# Patient Record
Sex: FEMALE | Race: ASIAN | NOT HISPANIC OR LATINO | Employment: UNEMPLOYED | URBAN - METROPOLITAN AREA
[De-identification: names, ages, dates, MRNs, and addresses within clinical notes are randomized per-mention and may not be internally consistent; named-entity substitution may affect disease eponyms.]

---

## 2020-07-23 ENCOUNTER — ANESTHESIA - HEALTHEAST (OUTPATIENT)
Dept: SURGERY | Facility: CLINIC | Age: 25
End: 2020-07-23

## 2020-07-23 ENCOUNTER — SURGERY - HEALTHEAST (OUTPATIENT)
Dept: SURGERY | Facility: CLINIC | Age: 25
End: 2020-07-23

## 2020-07-23 DIAGNOSIS — R10.84 ABDOMINAL PAIN, GENERALIZED: Primary | ICD-10-CM

## 2020-07-23 ASSESSMENT — MIFFLIN-ST. JEOR
SCORE: 1182.22
SCORE: 1182.22

## 2020-07-24 ENCOUNTER — SURGERY - HEALTHEAST (OUTPATIENT)
Dept: SURGERY | Facility: CLINIC | Age: 25
End: 2020-07-24

## 2020-07-28 ENCOUNTER — DOCUMENTATION ONLY (OUTPATIENT)
Dept: FAMILY MEDICINE | Facility: CLINIC | Age: 25
End: 2020-07-28

## 2020-07-28 NOTE — PROGRESS NOTES
Date of discharge: 07/24/20  Facility of discharge: [facility]  Patient concerns about condition: still a little sore from the surergy.  Patient concerns about medications: No concerns at this time.  Full med reconciliation will be completed at clinic visit.  Patient concerns about transitioning: No concerns at this time.    Was the patient diagnosed with COVID while in the hospital? No    Clinic office visit appointment date: 8/4/20 with Dr. Bobo  Patient reminded to bring all medications (prescription and over-the-counter) to clinic appointment: Yes    SHARMAINE Dumont

## 2020-08-03 ENCOUNTER — ALLIED HEALTH/NURSE VISIT (OUTPATIENT)
Dept: PHARMACY | Facility: PHYSICIAN GROUP | Age: 25
End: 2020-08-03
Payer: COMMERCIAL

## 2020-08-03 DIAGNOSIS — K35.80 ACUTE APPENDICITIS, UNSPECIFIED ACUTE APPENDICITIS TYPE: Primary | ICD-10-CM

## 2020-08-03 RX ORDER — AMOXICILLIN 250 MG
1 CAPSULE ORAL 2 TIMES DAILY
COMMUNITY
Start: 2020-07-24

## 2020-08-03 RX ORDER — ACETAMINOPHEN 500 MG
500-1000 TABLET ORAL 3 TIMES DAILY PRN
COMMUNITY
Start: 2020-07-24

## 2020-08-03 NOTE — PROGRESS NOTES
MTM ENCOUNTER  SUBJECTIVE/OBJECTIVE:                           Eh Day Day is a 24 year old female called for a TCM visit.  She was discharged from [facility] on 7/24/20 for appendicitis. Today's visit is a co-visit with Dr. Bobo, who is seeing pt tomorrow.     Patient consented to a telehealth visit: yes  Telemedicine Visit Details  Type of service:  Telephone visit  Start Time: 2:40pm  End Time: 2:50  Originating Location (pt. Location): Home  Distant Location (provider location):  [facility] MTM  Mode of Communication:  Telephone    Chief Complaint: hospital followup.    The following medications were added in the hospital:    APAP 500mg 1-2 tabs q4h prn    Senna Doc 8.6/50 1 tab BID  The following medications were discontinued in the hospital:    None  The following medications had dose/frequency changes in the hospital:    None  The following medication changes made in the hospital were not implemented by the patient:    Ibuprofen 600mg q 6 hr prn was started and patient has but id not taking.    Allergies/ADRs: Reviewed in EHR  Tobacco: Never smoker  Alcohol: never used  PMH: Reviewed in EHR    Medication Adherence/Access: no issues reported    S/P appendicitis/appendectomy: Taking APAP 500mg 2 tabs q6hr PRN (using regularly 3 times daily). No side effects. Helps pain from her surgery.    Constipation: Taking senna/doc 1 tab BID regularly. Effective; having 1 BM daily. No adverse effects.    Today's Vitals: There were no vitals taken for this visit.      ASSESSMENT:                              Medication Adherence: good, no issues identified    S/P appendicitis/appendectomy: Stable    Constipation: Stable      PLAN:                          Post Discharge Medication Reconciliation Status: discharge medications reconciled, continue medications without change.       Continue acetaminophen and senna/doc.    I spent 10 minutes with this patient today. All changes were made via  collaborative practice agreement with Dr. Bobo. A copy of the visit note was provided to the patient's referring provider.    Will follow up with Dr. Bobo tomorrow.    The patient was given a summary of these recommendations per AVS from Dr. Bobo (see his AVS tomorrow).    Kelly Mancuso, Pharm.D.

## 2020-08-04 ENCOUNTER — OFFICE VISIT (OUTPATIENT)
Dept: FAMILY MEDICINE | Facility: CLINIC | Age: 25
End: 2020-08-04
Payer: COMMERCIAL

## 2020-08-04 VITALS
WEIGHT: 110.6 LBS | BODY MASS INDEX: 22.3 KG/M2 | SYSTOLIC BLOOD PRESSURE: 90 MMHG | RESPIRATION RATE: 20 BRPM | OXYGEN SATURATION: 100 % | HEIGHT: 59 IN | TEMPERATURE: 97.7 F | DIASTOLIC BLOOD PRESSURE: 61 MMHG | HEART RATE: 68 BPM

## 2020-08-04 DIAGNOSIS — D50.9 MICROCYTIC ANEMIA: ICD-10-CM

## 2020-08-04 DIAGNOSIS — Z09 S/P APPENDECTOMY, FOLLOW-UP EXAM: Primary | ICD-10-CM

## 2020-08-04 ASSESSMENT — MIFFLIN-ST. JEOR: SCORE: 1148.18

## 2020-08-04 NOTE — PROGRESS NOTES
Preceptor Attestation:    Patient seen and evaluated in person. I discussed the patient with the resident. I have verified the content of the note, which accurately reflects my assessment of the patient and the plan of care.   Supervising Physician:  Randall Wahl MD.

## 2020-08-04 NOTE — LETTER
August 20, 2020       Day Day  [address] [place] [address]        Dear ,    We are writing to inform you of your test results.    Your blood work indicates that you have alpha thalassemia.     Alpha thalassemia is a blood disorder. It affects the production of a protein in the red blood cells (RBCs). This protein, called hemoglobin, allows RBCs to carry oxygen to all parts of the body. Hemoglobin is made up of smaller parts, including alpha globin and beta globin. When problems with alpha globin lead to less hemoglobin in the blood, the condition is called alpha thalassemia. Alpha thalassemia can cause anemia (not enough healthy hemoglobin in the blood).         Who is at risk of alpha thalassemia?   This condition affects certain groups more often than others. This includes people who have family members from [place], [place], [place], and [place].     What are the types of alpha thalassemia?   Alpha thalassemia is inherited. This means it is passed from parent to child through genes. There are 4 genes for alpha globin. If any one of these genes is abnormal, the amount of hemoglobin in the body is affected. Alpha thalassemia is broken down into types. The number of problem genes determines the type. The 4 main types are:     Silent carrier state. This type occurs when there is only 1 abnormal gene. It is unlikely to cause health problems.     Alpha thalassemia trait (also called alpha thalassemia minor). This type occurs when there are 2 abnormal genes. It may affect the size of the RBCs and cause mild anemia.       Hemoglobin H disease. This type occurs when there are 3 abnormal genes. It is more likely to affect the RBCs and cause moderate to severe anemia. It can also lead to other problems in the body.     Alpha thalassemia major. This type occurs when there are 4 abnormal genes. It is the most severe form of the disease and very rare. Hemoglobin is not able to be produced. A  baby with this condition will usually die in the mother's uterus or soon after birth.     Variations of these types can also occur.     What are the symptoms of alpha thalassemia?   The symptoms of alpha thalassemia depend on the type. Most silent carriers do not have symptoms. Children with alpha thalassemia trait or hemoglobin H disease may have symptoms that range from mild to severe. These include:     Pale skin     Crankiness     Weakness     Tiredness     Shortness of breath     Trouble doing normal amounts of exercise (exercise intolerance)     Fast heartbeat     Dizziness or fainting     Yellowing of the eyes, skin, or mouth, and dark urine (jaundice)     Bone problems including bones of the face or skull being wider     Stomach pain and swelling of the belly     Not growing as expected       Alpha thalassemia is a lifelong condition.       Parents who both have alpha thalassemia trait can have a child with more severe disease. Talk to a genetic counselor for more information.       Fel free to contact the clinic with further questions or concerns.     Resulted Orders   Hemoglobinopathy/Thal Luis ([facility])   Result Value Ref Range    Hemoglobin A2 5.6 (H) 2.2 - 3.5 %    Hemoglobin F 3.7 (H) 0.0 - 2.0 %    Hemoglobin Electrophoresis SEE RESULTS BELOW       Comment:      Increase in hemoglobin A2 fraction accompanied by mild increase in hemoglobin   F fraction.  This patient has microcytic hypochromic anemia in addition to elevations in   hemoglobin A2 and hemoglobin F, a nonspecific profile that may be observed in   the setting of alpha thalassemia with or without a concurrent abnormality in   beta globin synthesis. Consideration may be given to genetic sequencing   studies in order to definitively characterize this patient's apparent   underlying congenital hemoglobinopathy.       Path ICD D50.9     Interpreted By Jani Mccann MD     Narrative    Test performed by:  [facility]   [address] [place] [address]     If you have any questions or concerns, please call the clinic at the number listed above.     Sincerely,    Dimitri Bobo MD

## 2020-08-04 NOTE — NURSING NOTE
Due to patient being non-English speaking/uses sign language, an  was used for this visit. Only for face-to-face interpretation by an external agency, date and length of interpretation can be found on the scanned worksheet.       name: Mejia Marsh (Htoo)  Language: Olivia  Agency:  [employer]  Phone number: [phone]  Type of interpretation:  telephone, spoken

## 2020-08-04 NOTE — PROGRESS NOTES
"  Hospitalization Follow-up Visit         [facility]       Hospital Follow-up Visit:    Hospital:  [facility]   Date of Admission: 7/23/20  Date of Discharge: 7/24/20  Reason(s) for Admission: Acute appendicitis            Problems taking medications regularly:  None       Post Discharge Medication Reconciliation: medication reconcilation previously completed during another office visit.       Problems adhering to non-medication therapy:  None       Medications reviewed by: by PharmD    Summary of hospitalization:  [facility] discharge summary reviewed  Diagnostic Tests/Treatments reviewed.  Follow up needed: none  Other Healthcare Providers Involved in Patient s Care:         None  Update since discharge: improved. Still with some incisional pain at the lower quadrant, relieved with Tylenol and IBU.   Plan of care communicated with patient            A [employer]  was used for  this visit.             Review of Systems:   CONSTITUTIONAL: no fatigue, some lightheadedness  SKIN: incisions well healeds  RESP: no significant cough, no shortness of breath  CV: no chest pain, no palpitations  GI: no nausea, no vomiting, no constipation, no diarrhea            Physical Exam:     Vitals:    08/04/20 0923   BP: 90/61   BP Location: Right arm   Patient Position: Sitting   Cuff Size: Adult Regular   Pulse: 68   Resp: 20   Temp: 97.7  F (36.5  C)   TempSrc: Oral   SpO2: 100%   Weight: 50.2 kg (110 lb 9.6 oz)   Height: 1.5 m (4' 11.06\")     Body mass index is 22.3 kg/m .    GENERAL: healthy, alert and no distress  EYES: Eyes grossly normal to inspection, extraocular movements - intact  RESP: normal work of breathing  CV: appears well perfused  ABDOMEN: soft, no tenderness, no  hepatosplenomegaly, no masses, normal bowel sounds, TTP at incision sites--otherwise clean, dry, intact  MS: extremities- no gross deformities noted, no edema  NEURO: strength and tone- normal, sensory exam- grossly normal, mentation- intact, " speech- normal  PSYCH: Alert and oriented times 3; speech- coherent , normal rate and volume; able to articulate logical thoughts, able to abstract reason, no tangential thoughts, no hallucinations or delusions, affect- normal      Assessment and Plan       was seen today for hospital f/u.    Diagnoses and all orders for this visit:    S/P appendectomy, follow-up exam  Improving. Pain adequately controlled with IBU and Tylenol. Advised to continue pain medications and stool softeners and to follow up as needed. Anticipate full recovery without complications.     Microcytic anemia  Reports positive history of thalassemia in brother and sister. Did have low iron studies and microcytic anemia in the hospital. If negative for hemoglobinopathy, will send for iron supplementation.   -     Hemoglobinopathy/Thal Luis ([facility])      E&M code to be billed if TCM cannot be: 91177    Type of decision making: Moderate complexity (89499)    Options for treatment and follow-up care were reviewed with the patient   [facility]   engaged in the decision making process and verbalized understanding of the options discussed and agreed with the final plan.      Dimitri Bobo MD  8/4/2020    Staffed with Dr. Wahl.

## 2020-08-07 LAB
HEMOGLOBIN A2: 5.6 % (ref 2.2–3.5)
HEMOGLOBIN ELECTROPHORESIS: ABNORMAL
HEMOGLOBIN F: 3.7 % (ref 0–2)
INTERPRETED BY: ABNORMAL
PATH ICD: ABNORMAL

## 2020-08-11 NOTE — RESULT ENCOUNTER NOTE
Your blood work indicates that you have alpha thalassemia.     Alpha thalassemia is a blood disorder. It affects the production of a protein in the red blood cells (RBCs). This protein, called hemoglobin, allows RBCs to carry oxygen to all parts of the body. Hemoglobin is made up of smaller parts, including alpha globin and beta globin. When problems with alpha globin lead to less hemoglobin in the blood, the condition is called alpha thalassemia. Alpha thalassemia can cause anemia (not enough healthy hemoglobin in the blood).       Who is at risk of alpha thalassemia?  This condition affects certain groups more often than others. This includes people who have family members from [place], [place], [place], and [place].    What are the types of alpha thalassemia?  Alpha thalassemia is inherited. This means it is passed from parent to child through genes. There are 4 genes for alpha globin. If any one of these genes is abnormal, the amount of hemoglobin in the body is affected. Alpha thalassemia is broken down into types. The number of problem genes determines the type. The 4 main types are:    Silent carrier state. This type occurs when there is only 1 abnormal gene. It is unlikely to cause health problems.    Alpha thalassemia trait (also called alpha thalassemia minor). This type occurs when there are 2 abnormal genes. It may affect the size of the RBCs and cause mild anemia.      Hemoglobin H disease. This type occurs when there are 3 abnormal genes. It is more likely to affect the RBCs and cause moderate to severe anemia. It can also lead to other problems in the body.    Alpha thalassemia major. This type occurs when there are 4 abnormal genes. It is the most severe form of the disease and very rare. Hemoglobin is not able to be produced. A baby with this condition will usually die in the mother's uterus or soon after birth.    Variations of these types can also occur.     What are the symptoms of  alpha thalassemia?  The symptoms of alpha thalassemia depend on the type. Most silent carriers do not have symptoms. Children with alpha thalassemia trait or hemoglobin H disease may have symptoms that range from mild to severe. These include:    Pale skin    Crankiness    Weakness    Tiredness    Shortness of breath    Trouble doing normal amounts of exercise (exercise intolerance)    Fast heartbeat    Dizziness or fainting    Yellowing of the eyes, skin, or mouth, and dark urine (jaundice)    Bone problems including bones of the face or skull being wider    Stomach pain and swelling of the belly    Not growing as expected      Alpha thalassemia is a lifelong condition.       Parents who both have alpha thalassemia trait can have a child with more severe disease. Talk to a genetic counselor for more information.      Fel free to contact the clinic with further questions or concerns.

## 2021-06-04 VITALS
HEIGHT: 59 IN | BODY MASS INDEX: 24.07 KG/M2 | WEIGHT: 119.4 LBS | HEIGHT: 59 IN | WEIGHT: 119.4 LBS | BODY MASS INDEX: 24.07 KG/M2

## 2021-06-09 NOTE — ANESTHESIA POSTPROCEDURE EVALUATION
Patient: Ana Olvera  Procedure(s):  APPENDECTOMY, LAPAROSCOPIC  Anesthesia type: general    Patient location: PACU  Last vitals:   Vitals Value Taken Time   BP 99/62 7/24/2020  2:45 AM   Temp 37  C (98.6  F) 7/24/2020  2:45 AM   Pulse 94 7/24/2020  2:45 AM   Resp 24 7/24/2020  2:45 AM   SpO2 96 % 7/24/2020  2:45 AM     Post vital signs: stable  Level of consciousness: awake and responds to simple questions  Post-anesthesia pain: pain controlled  Post-anesthesia nausea and vomiting: no  Pulmonary: unassisted, return to baseline  Cardiovascular: stable and blood pressure at baseline  Hydration: adequate  Anesthetic events: no    QCDR Measures:  ASA# 11 - Josy-op Cardiac Arrest: ASA11B - Patient did NOT experience unanticipated cardiac arrest  ASA# 12 - Josy-op Mortality Rate: ASA12B - Patient did NOT die  ASA# 13 - PACU Re-Intubation Rate: ASA13B - Patient did NOT require a new airway mgmt  ASA# 10 - Composite Anes Safety: ASA10A - No serious adverse event    Additional Notes:

## 2021-06-09 NOTE — ANESTHESIA PREPROCEDURE EVALUATION
Anesthesia Evaluation      Patient summary reviewed     Airway   Mallampati: II  Neck ROM: full   Pulmonary - normal exam                          Cardiovascular   Exercise tolerance: > or = 4 METS  Rhythm: regular  Rate: normal,         Neuro/Psych      Endo/Other    (-) no obesity     GI/Hepatic/Renal       Other findings: No Covid result      Dental - normal exam                        Anesthesia Plan  Planned anesthetic: general endotracheal    ASA 2   Induction: intravenous   Anesthetic plan and risks discussed with: patient  Anesthesia plan special considerations: antiemetics,   Post-op plan: routine recovery

## 2021-06-16 PROBLEM — K35.80 ACUTE APPENDICITIS, UNSPECIFIED ACUTE APPENDICITIS TYPE: Status: ACTIVE | Noted: 2020-07-23

## 2021-06-16 PROBLEM — K37 APPENDICITIS: Status: ACTIVE | Noted: 2020-07-24

## 2021-06-16 PROBLEM — K35.80 ACUTE APPENDICITIS: Status: ACTIVE | Noted: 2020-07-23

## 2021-11-24 ENCOUNTER — IMMUNIZATION (OUTPATIENT)
Dept: FAMILY MEDICINE | Facility: CLINIC | Age: 26
End: 2021-11-24
Payer: COMMERCIAL

## 2021-11-24 PROCEDURE — 91300 PR COVID VAC PFIZER DIL RECON 30 MCG/0.3 ML IM: CPT

## 2021-11-24 PROCEDURE — 0001A PR COVID VAC PFIZER DIL RECON 30 MCG/0.3 ML IM: CPT

## 2021-12-15 ENCOUNTER — IMMUNIZATION (OUTPATIENT)
Dept: FAMILY MEDICINE | Facility: CLINIC | Age: 26
End: 2021-12-15
Attending: FAMILY MEDICINE
Payer: COMMERCIAL

## 2021-12-15 PROCEDURE — 91300 PR COVID VAC PFIZER DIL RECON 30 MCG/0.3 ML IM: CPT

## 2021-12-15 PROCEDURE — 0002A PR COVID VAC PFIZER DIL RECON 30 MCG/0.3 ML IM: CPT

## 2023-03-17 NOTE — ANESTHESIA CARE TRANSFER NOTE
Last vitals:   Vitals:    07/24/20 0226   BP: 110/68   Pulse: 100   Resp: 24   Temp: 36.8  C (98.2  F)   SpO2: 100%     Patient's level of consciousness is drowsy  Spontaneous respirations: yes  Maintains airway independently: yes  Dentition unchanged: yes  Oropharynx: oropharynx clear of all foreign objects    QCDR Measures:  ASA# 20 - Surgical Safety Checklist: WHO surgical safety checklist completed prior to induction    PQRS# 430 - Adult PONV Prevention: 4558F - Pt received => 2 anti-emetic agents (different classes) preop & intraop  ASA# 8 - Peds PONV Prevention: NA - Not pediatric patient, not GA or 2 or more risk factors NOT present  PQRS# 424 - Josy-op Temp Management: 4559F - At least one body temp DOCUMENTED => 35.5C or 95.9F within required timeframe  PQRS# 426 - PACU Transfer Protocol: - Transfer of care checklist used  ASA# 14 - Acute Post-op Pain: ASA14B - Patient did NOT experience pain >= 7 out of 10   [Time Spent: ___ minutes] : I have spent [unfilled] minutes of time on the encounter.

## 2024-04-14 ENCOUNTER — HOSPITAL ENCOUNTER (EMERGENCY)
Facility: HOSPITAL | Age: 29
Discharge: HOME | End: 2024-04-14
Attending: EMERGENCY MEDICINE | Admitting: EMERGENCY MEDICINE
Payer: COMMERCIAL

## 2024-04-14 ENCOUNTER — APPOINTMENT (OUTPATIENT)
Dept: CT IMAGING | Facility: HOSPITAL | Age: 29
End: 2024-04-14
Attending: EMERGENCY MEDICINE
Payer: COMMERCIAL

## 2024-04-14 VITALS
HEART RATE: 59 BPM | RESPIRATION RATE: 18 BRPM | HEIGHT: 59 IN | BODY MASS INDEX: 23.18 KG/M2 | OXYGEN SATURATION: 99 % | TEMPERATURE: 98.9 F | SYSTOLIC BLOOD PRESSURE: 91 MMHG | WEIGHT: 115 LBS | DIASTOLIC BLOOD PRESSURE: 56 MMHG

## 2024-04-14 DIAGNOSIS — M54.9 UPPER BACK PAIN: ICD-10-CM

## 2024-04-14 DIAGNOSIS — R07.9 CHEST PAIN, UNSPECIFIED TYPE: ICD-10-CM

## 2024-04-14 LAB
ANION GAP SERPL CALCULATED.3IONS-SCNC: 8 MMOL/L (ref 7–15)
BUN SERPL-MCNC: 8.4 MG/DL (ref 6–20)
CALCIUM SERPL-MCNC: 9.3 MG/DL (ref 8.6–10)
CHLORIDE SERPL-SCNC: 102 MMOL/L (ref 98–107)
CREAT SERPL-MCNC: 0.63 MG/DL (ref 0.51–0.95)
DEPRECATED HCO3 PLAS-SCNC: 27 MMOL/L (ref 22–29)
EGFRCR SERPLBLD CKD-EPI 2021: >90 ML/MIN/1.73M2
ERYTHROCYTE [DISTWIDTH] IN BLOOD BY AUTOMATED COUNT: 17.4 % (ref 10–15)
GLUCOSE SERPL-MCNC: 89 MG/DL (ref 70–99)
HCG SERPL QL: NEGATIVE
HCT VFR BLD AUTO: 34.3 % (ref 35–47)
HGB BLD-MCNC: 10.8 G/DL (ref 11.7–15.7)
HOLD SPECIMEN: NORMAL
MCH RBC QN AUTO: 19.7 PG (ref 26.5–33)
MCHC RBC AUTO-ENTMCNC: 31.5 G/DL (ref 31.5–36.5)
MCV RBC AUTO: 63 FL (ref 78–100)
PLATELET # BLD AUTO: 346 10E3/UL (ref 150–450)
POTASSIUM SERPL-SCNC: 4 MMOL/L (ref 3.4–5.3)
RBC # BLD AUTO: 5.47 10E6/UL (ref 3.8–5.2)
SODIUM SERPL-SCNC: 137 MMOL/L (ref 135–145)
TROPONIN T SERPL HS-MCNC: <6 NG/L
WBC # BLD AUTO: 5.9 10E3/UL (ref 4–11)

## 2024-04-14 PROCEDURE — 250N000011 HC RX IP 250 OP 636: Performed by: EMERGENCY MEDICINE

## 2024-04-14 PROCEDURE — 84703 CHORIONIC GONADOTROPIN ASSAY: CPT | Performed by: EMERGENCY MEDICINE

## 2024-04-14 PROCEDURE — 36415 COLL VENOUS BLD VENIPUNCTURE: CPT | Performed by: EMERGENCY MEDICINE

## 2024-04-14 PROCEDURE — 84484 ASSAY OF TROPONIN QUANT: CPT | Performed by: EMERGENCY MEDICINE

## 2024-04-14 PROCEDURE — 85027 COMPLETE CBC AUTOMATED: CPT | Performed by: EMERGENCY MEDICINE

## 2024-04-14 PROCEDURE — 96374 THER/PROPH/DIAG INJ IV PUSH: CPT | Mod: 59

## 2024-04-14 PROCEDURE — 80048 BASIC METABOLIC PNL TOTAL CA: CPT | Performed by: EMERGENCY MEDICINE

## 2024-04-14 PROCEDURE — 71275 CT ANGIOGRAPHY CHEST: CPT

## 2024-04-14 PROCEDURE — 93005 ELECTROCARDIOGRAM TRACING: CPT | Performed by: EMERGENCY MEDICINE

## 2024-04-14 PROCEDURE — 99285 EMERGENCY DEPT VISIT HI MDM: CPT | Mod: 25

## 2024-04-14 RX ORDER — IOPAMIDOL 755 MG/ML
100 INJECTION, SOLUTION INTRAVASCULAR ONCE
Status: COMPLETED | OUTPATIENT
Start: 2024-04-14 | End: 2024-04-14

## 2024-04-14 RX ORDER — KETOROLAC TROMETHAMINE 15 MG/ML
15 INJECTION, SOLUTION INTRAMUSCULAR; INTRAVENOUS ONCE
Status: COMPLETED | OUTPATIENT
Start: 2024-04-14 | End: 2024-04-14

## 2024-04-14 RX ADMIN — IOPAMIDOL 90 ML: 755 INJECTION, SOLUTION INTRAVENOUS at 20:20

## 2024-04-14 RX ADMIN — KETOROLAC TROMETHAMINE 15 MG: 15 INJECTION, SOLUTION INTRAMUSCULAR; INTRAVENOUS at 19:53

## 2024-04-14 ASSESSMENT — COLUMBIA-SUICIDE SEVERITY RATING SCALE - C-SSRS
1. IN THE PAST MONTH, HAVE YOU WISHED YOU WERE DEAD OR WISHED YOU COULD GO TO SLEEP AND NOT WAKE UP?: NO
6. HAVE YOU EVER DONE ANYTHING, STARTED TO DO ANYTHING, OR PREPARED TO DO ANYTHING TO END YOUR LIFE?: NO
2. HAVE YOU ACTUALLY HAD ANY THOUGHTS OF KILLING YOURSELF IN THE PAST MONTH?: NO

## 2024-04-14 ASSESSMENT — ACTIVITIES OF DAILY LIVING (ADL)
ADLS_ACUITY_SCORE: 35

## 2024-04-14 NOTE — ED TRIAGE NOTES
Pt reports CP starting last evening, the pain has not subsided and now is spreading to her L arm.      Triage Assessment (Adult)       Row Name 04/14/24 1936          Triage Assessment    Airway WDL WDL        Respiratory WDL    Respiratory WDL WDL        Skin Circulation/Temperature WDL    Skin Circulation/Temperature WDL WDL        Cardiac WDL    Cardiac WDL X;chest pain        Chest Pain Assessment    Chest Pain Location midsternal     Character pressure        Peripheral/Neurovascular WDL    Peripheral Neurovascular WDL WDL        Cognitive/Neuro/Behavioral WDL    Cognitive/Neuro/Behavioral WDL WDL

## 2024-04-15 LAB
ATRIAL RATE - MUSE: 60 BPM
DIASTOLIC BLOOD PRESSURE - MUSE: NORMAL MMHG
INTERPRETATION ECG - MUSE: NORMAL
P AXIS - MUSE: -4 DEGREES
PR INTERVAL - MUSE: 170 MS
QRS DURATION - MUSE: 78 MS
QT - MUSE: 416 MS
QTC - MUSE: 416 MS
R AXIS - MUSE: 58 DEGREES
SYSTOLIC BLOOD PRESSURE - MUSE: NORMAL MMHG
T AXIS - MUSE: 31 DEGREES
VENTRICULAR RATE- MUSE: 60 BPM

## 2024-04-15 NOTE — ED PROVIDER NOTES
Emergency Department Encounter     Evaluation Date & Time:   2024  6:55 PM    CHIEF COMPLAINT:  Chest Pain and Arm Pain      Triage Note:Pt reports CP starting last evening, the pain has not subsided and now is spreading to her L arm.      Triage Assessment (Adult)       Row Name 24 [id number]          Triage Assessment    Airway WDL WDL        Respiratory WDL    Respiratory WDL WDL        Skin Circulation/Temperature WDL    Skin Circulation/Temperature WDL WDL        Cardiac WDL    Cardiac WDL X;chest pain        Chest Pain Assessment    Chest Pain Location midsternal     Character pressure        Peripheral/Neurovascular WDL    Peripheral Neurovascular WDL WDL        Cognitive/Neuro/Behavioral WDL    Cognitive/Neuro/Behavioral WDL WDL                           Impression and Plan       FINAL IMPRESSION:    ICD-10-CM    1. Chest pain, unspecified type  R07.9 Primary Care Referral      2. Upper back pain  M54.9 Primary Care Referral            ED COURSE & MEDICAL DECISION MAKIN:24 PM I met with the patient, gathered information, history, and performed initial exam.    28 year old female, history of appendicitis s/p appendectomy, who presents for evaluation of upper back pain radiating into her chest and now BL arms, which has been constant since onset last night. The pain feels like a pressure and is worse with resting and moving into certain positions. She reports mild shortness of breath and lightheadedness.    On exam, she has RRR with clear symmetrical breath sounds.  Peripheral pulses are strong and symmetrical.    Patient placed on cardiac monitor, IV access established and blood sent for labs.    EKG performed and demonstrated NSR with no ischemic changes.  Troponin negative (<6); a single, normal troponin is reassuring that symptoms are not secondary to ACS given that they have been ongoing for >6 hours and I do not think serial troponin testing is indicated.    Given her severe pain with  radiation from the back, aortic dissection considered for which CTA chest / abdomen / pelvis was performed and unremarkable.    Labs otherwise remarkable for no leukocytosis with mild anemia (Hb 10.8).  She has no significant electrolyte derangements or renal impairment.    Patient is feeling better after a dose of IV Toradol.    Patient discharged to home with follow-up with her PCP.  Return precautions provided with assistance from the professional Olivia .  Patient hemodynamically stable throughout ED course.      At the conclusion of the encounter I discussed the results of all the tests and the disposition. The questions were answered. The patient acknowledged understanding and was agreeable with the care plan.      Medical Decision Making    History:  Obtained supplemental history:Supplemental history obtained?: No  Reviewed external records: External records reviewed?: Other: [facility] admission from 7/23/2020-7/24/2020 for acute appendicitis.     External consultation:  Did you consider but not order tests?: Work up considered but not performed and documented in chart, if applicable  Did you interpret images independently?: Independent interpretation of ECG and images noted in documentation, when applicable.  Consultation discussion with other provider:Did you involve another provider (consultant, , pharmacy, etc.)?: No    Complicating factors:  Care impacted by chronic illness:N/A  Care significantly affected by social determinants of health:N/A    Disposition considerations: Discharge. No recommendations on prescription strength medication(s). I considered admission, but ultimately discharged patient given reassuring evaluation and clinical improvement..    MEDICATIONS GIVEN IN THE EMERGENCY DEPARTMENT:  Medications   ketorolac (TORADOL) injection 15 mg (15 mg Intravenous $Given 4/14/24 1953)   iopamidol (ISOVUE-370) solution 100 mL (90 mLs Intravenous $Given 4/14/24  "2020)       NEW PRESCRIPTIONS STARTED AT TODAY'S ED VISIT:  Discharge Medication List as of 4/14/2024  9:22 PM          HPI     HPI     Due to language barrier, history obtained with assistance from the professional Olivia  via the language line on [employer].    Ana Olvera is a 28 year old female, history of appendicitis s/p appendectomy, who presents to this ED by walk-in for evaluation of chest pain.    Patient reports upper back pain radiating to her chest, which has been constant since onset last night. The pain today has started radiating down BL arms. The pain is severe and feels like a pressure, as if \"something is sitting on me\".  The pain is not exertional or pleuritic in nature, although worsens when she is sleeping and with moving in certain ways.  She reports mild shortness of breath and lightheadedness associated with her symptoms. No nausea / vomiting.    She otherwise denies headache, abdominal pain, diarrhea, cough, fever or other concerns.    REVIEW OF SYSTEMS:  All other systems reviewed and are negative.      Medical History     No past medical history on file.    Past Surgical History:   Procedure Laterality Date    appendecomy  07/24/2020    DE LAP,APPENDECTOMY N/A 7/24/2020    Procedure: APPENDECTOMY, LAPAROSCOPIC;  Surgeon: Hansel Carmona MD;  Location: [facility];  Service: General       Family History   Problem Relation Age of Onset    Diabetes Father     Diabetes Mother     Thalassemia Sister     Thalassemia Brother     Cancer No family hx of     Hypertension No family hx of        Social History     Tobacco Use    Smoking status: Never    Smokeless tobacco: Never       acetaminophen (TYLENOL) 500 MG tablet  senna-docusate (SENOKOT-S/PERICOLACE) 8.6-50 MG tablet        Physical Exam     First Vitals:  Patient Vitals for the past 24 hrs:   BP Temp Temp src Pulse Resp SpO2 Height Weight   04/14/24 2130 91/56 -- -- 59 -- 99 % -- --   04/14/24 2115 93/58 -- -- 52 18 98 " "% -- --   04/14/24 2100 108/59 -- -- 53 16 98 % -- --   04/14/24 1945 100/70 -- -- 53 22 97 % -- --   04/14/24 1930 98/70 -- -- 61 24 93 % -- --   04/14/24 1920 96/67 -- -- 57 22 99 % -- --   04/14/24 1900 103/68 -- -- 58 22 99 % -- --   04/14/24 1845 115/69 98.9  F (37.2  C) Temporal 67 20 100 % 1.499 m (4' 11\") 52.2 kg (115 lb)       PHYSICAL EXAM:   Physical Exam    GENERAL: Awake, alert.  In moderate acute distress. Unable to sit herself up in the bed.    HEENT: Normocephalic, atraumatic.   NECK: No stridor.  PULMONARY: Symmetrical breath sounds without distress.  Lungs clear to auscultation bilaterally without wheezes, rhonchi or rales.  CARDIO: Regular rate and rhythm.  No significant murmur, rub or gallop.  Radial and pedal pulses strong and symmetrical.  ABDOMINAL: Abdomen soft, non-distended and non-tender to palpation.    EXTREMITIES: No lower extremity swelling or edema.      NEURO: Alert and oriented to person, place and time.  Cranial nerves grossly intact.  No focal motor deficit.  PSYCH: Normal mood and affect.    Results     LAB:  All pertinent labs reviewed and interpreted  Labs Ordered and Resulted from Time of ED Arrival to Time of ED Departure   CBC WITH PLATELETS - Abnormal       Result Value    WBC Count 5.9      RBC Count 5.47 (*)     Hemoglobin 10.8 (*)     Hematocrit 34.3 (*)     MCV 63 (*)     MCH 19.7 (*)     MCHC 31.5      RDW 17.4 (*)     Platelet Count 346     BASIC METABOLIC PANEL - Normal    Sodium 137      Potassium 4.0      Chloride 102      Carbon Dioxide (CO2) 27      Anion Gap 8      Urea Nitrogen 8.4      Creatinine 0.63      GFR Estimate >90      Calcium 9.3      Glucose 89     TROPONIN T, HIGH SENSITIVITY - Normal    Troponin T, High Sensitivity <6     HCG QUALITATIVE PREGNANCY - Normal    hCG Serum Qualitative Negative         RADIOLOGY:  CTA Chest Abdomen Pelvis w Contrast   Final Result   IMPRESSION:   1.  Normal CTA of the chest, abdomen, and pelvis. No evidence of aortic " dissection. An etiology for the patient's pain is not identified.             EC2024, 18:49; NSR with rate of 60 bpm; normal intervals; normal conduction; no ST-T wave changes consistent with ACS or pericarditis; no previous EKG available for comparison    EKG independently reviewed and interpreted by Ingrid Álvarez MD      I, Carey Malagon , am serving as a scribe to document services personally performed by Ingrid Álvarez MD based on my observation and the provider's statements to me. I, Ingrid Álvarez MD attest that Carey Malagon  is acting in a scribe capacity, has observed my performance of the services and has documented them in accordance with my direction.    Ingrid Álvarez MD  Emergency Medicine  [facility] EMERGENCY DEPARTMENT     Ingrid Álvarez MD  04/15/24 [id number]

## 2024-04-15 NOTE — DISCHARGE INSTRUCTIONS
Please follow-up with the [facility] this upcoming week for a recheck and to establish primary care; call to arrange appointment.    Return to the ER for worsening symptoms, worsening chest pain, worsening back pain, shortness of breath, if you pass out or feel that you might, nausea / vomiting, fever or other concerns.     yes No

## 2025-05-28 ENCOUNTER — OFFICE VISIT (OUTPATIENT)
Dept: FAMILY MEDICINE | Facility: CLINIC | Age: 30
End: 2025-05-28
Payer: COMMERCIAL

## 2025-05-28 ENCOUNTER — TELEPHONE (OUTPATIENT)
Dept: FAMILY MEDICINE | Facility: CLINIC | Age: 30
End: 2025-05-28

## 2025-05-28 ENCOUNTER — RESULTS FOLLOW-UP (OUTPATIENT)
Dept: FAMILY MEDICINE | Facility: CLINIC | Age: 30
End: 2025-05-28

## 2025-05-28 VITALS
WEIGHT: 130 LBS | DIASTOLIC BLOOD PRESSURE: 79 MMHG | HEART RATE: 87 BPM | RESPIRATION RATE: 16 BRPM | TEMPERATURE: 98.2 F | OXYGEN SATURATION: 100 % | BODY MASS INDEX: 26.26 KG/M2 | SYSTOLIC BLOOD PRESSURE: 113 MMHG

## 2025-05-28 DIAGNOSIS — Z32.01 PREGNANCY TEST POSITIVE: Primary | ICD-10-CM

## 2025-05-28 DIAGNOSIS — R11.2 NAUSEA AND VOMITING, UNSPECIFIED VOMITING TYPE: ICD-10-CM

## 2025-05-28 LAB — HCG UR QL: POSITIVE

## 2025-05-28 RX ORDER — PRENATAL VIT,CAL 73/IRON/FOLIC 28 MG-1 MG
1 TABLET ORAL DAILY
Qty: 90 TABLET | Refills: 4 | Status: SHIPPED | OUTPATIENT
Start: 2025-05-28

## 2025-05-28 NOTE — TELEPHONE ENCOUNTER
Patient returning your call. You dont need an  when you call her back she understands and speaks english

## 2025-05-28 NOTE — TELEPHONE ENCOUNTER
Called pt and assisted her with scheduling OB Intake for Thursday, 6/5/25 at 8:40 AM with Dr Dick.  Pt denies any questions or concerns./Aria Correa RN BSN

## 2025-05-28 NOTE — PROGRESS NOTES
Preceptor Attestation:    I discussed the patient with the resident and evaluated the patient in person. I have verified the content of the note, which accurately reflects my assessment of the patient and the plan of care.   Supervising Physician:  Nabeel Frost DO.

## 2025-05-28 NOTE — PROGRESS NOTES
Assessment & Plan         Pregnancy test positive  Nausea and vomiting, unspecified vomiting type  Patient's last menstrual period was March 28 2025. hCG qualitative test came back positive confirming pregnancy.  We will set her up for her first prenatal visit.  Her nausea and vomiting is primarily due to pregnancy.  Started  her on prenatal vitamins and prescribed her pyridoxine and doxylamine for her nausea and vomiting.    - Prenatal Vit-Fe Fumarate-FA (TRINATE) TABS; Take 1 tablet by mouth daily.  - pyridOXINE 10 MG TABS; Take 10 mg by mouth every 8 hours as needed (nausea).  - doxylamine (UNISOM) 25 MG TABS tablet; Take 0.5 tablets (12.5 mg) by mouth 3 times daily as needed (nausea or vomiting).  - Route note to Aria Moran   Ana is a 29 year old, presenting for the following health issues:  Other (Positive pregnancy test )      HPI    This is a 29-year-old female presents here for confirmation of pregnancy due to positive home pregnancy test.  Patient has never been pregnant in the past.  Her last menstrual period was in March 28, 2025.  Patient is not on prenatal vitamins.  Patient does endorse nausea in the past few weeks.  Did have a vomiting episode this morning, vomited food contents.  Patient denies any abdominal pain or diarrhea or fevers.  Patient denies any allergies to medications.  Patient does not take any medications or has any other relevant medical history such as diabetes or hypertension.  Patient denies any family history postpartum hemorrhage or preeclampsia.          Objective    /79   Pulse 87   Temp 98.2  F (36.8  C) (Oral)   Resp 16   Wt 59 kg (130 lb)   LMP 04/20/2025 (Exact Date)   SpO2 100%   BMI 26.26 kg/m    Body mass index is 26.26 kg/m .  Physical Exam   GENERAL: alert and no distress  RESP: lungs clear to auscultation - no rales, rhonchi or wheezes  CV: regular rate and rhythm, normal S1 S2, no S3 or S4, no murmur, click or rub, no peripheral  edema  ABDOMEN: soft, nontender, no hepatosplenomegaly, no masses and bowel sounds normal  MS: no gross musculoskeletal defects noted, no edema  SKIN: no suspicious lesions or rashes  NEURO: Normal strength and tone, mentation intact and speech normal  PSYCH: mentation appears normal, affect normal/bright    hCG qualitative came back positive        Signed Electronically by: Arlene Barbour MD

## 2025-05-28 NOTE — TELEPHONE ENCOUNTER
LMTCC with Olivia  to assist pt with scheduling OB Intake (u/s) with either Dr Dick on 6/5 or Dr Zimmerman on 6/12/25./Aria Correa RN BSN

## 2025-06-05 ENCOUNTER — OFFICE VISIT (OUTPATIENT)
Dept: FAMILY MEDICINE | Facility: CLINIC | Age: 30
End: 2025-06-05
Payer: COMMERCIAL

## 2025-06-05 ENCOUNTER — ANCILLARY PROCEDURE (OUTPATIENT)
Dept: ULTRASOUND IMAGING | Facility: HOSPITAL | Age: 30
End: 2025-06-05
Attending: STUDENT IN AN ORGANIZED HEALTH CARE EDUCATION/TRAINING PROGRAM
Payer: COMMERCIAL

## 2025-06-05 DIAGNOSIS — Z34.90 EARLY STAGE OF PREGNANCY: Primary | ICD-10-CM

## 2025-06-05 DIAGNOSIS — D56.9 THALASSEMIA, UNSPECIFIED TYPE: ICD-10-CM

## 2025-06-05 DIAGNOSIS — Z34.90 EARLY STAGE OF PREGNANCY: ICD-10-CM

## 2025-06-05 NOTE — NURSING NOTE
Average Risk Category  No significant risk factors: Yes    At Risk Category (up to 3)  Teen pregnancy: No  Poor social situation: No  Domestic abuse: No  Financial difficulties: No  Smoker: No  H/O  deliver: No  H/O drug abuse: No  Non-English speaking: No  Advanced maternal age: No  GDM risks: No  Previous C/S: No  H/O PIH: No  H/O STIs: No  H/O mental health concerns: No  Onset care > 20 weeks: No  Other: Thalassemia referred to [facility] for genetic consult    High Risk Category (4 or more At Risk or)  Diabetes/GDM: No  Multiple gestation: No  Chronic hypertension: No  Significant hx of asthma: No  Fetal demise > 20 weeks: No  Positive tox screen: No  Current mental health treatment: No      Risk: At Risk   Date determined: 25     Discussed and gave out the following handouts: 1st trimester fetal development, first trimester pregnancy symptoms, when to see medical advice (ie vomiting frequently, vaginal bleeding or cramping), nutrition during pregnancy, nonmedicinal prevention/treatment of nausea & vomiting, heartburn, and constipation.  Gave proof of pregnancy and phone number for clinic and WIC./Aria Correa RN BSN

## 2025-06-05 NOTE — PROGRESS NOTES
Pregnancy Ultrasound for Dating and Initial Medical Assessment    29 year old,, at 11w0d by certain LMP.    Pregnancy was planned  Pregnancy is welcomed    No vaginal bleeding  No pain  Minimal nausea/vomiting  Good PO tolerance    Has an undefined thalassemia; definite beta component. Possible alpha component. Boyfriend doesn't have any known medical problems but doesn't see a doctor.    Sister has beta naught thalassemia.    Brother also has a thalassemia.      OB History    Para Term  AB Living   1 0 0 0 0 0   SAB IAB Ectopic Multiple Live Births   0 0 0 0 0      # Outcome Date GA Lbr Eleazar/2nd Weight Sex Type Anes PTL Lv   1 Current                Patient Active Problem List   Diagnosis    Acute appendicitis    Acute appendicitis, unspecified acute appendicitis type    Appendicitis       Past Surgical History:   Procedure Laterality Date    SC LAP,APPENDECTOMY N/A 2020    Procedure: APPENDECTOMY, LAPAROSCOPIC;  Surgeon: Hansel Carmona MD;  Location: [facility];  Service: General       No Known Allergies    Current Outpatient Medications   Medication Sig Dispense Refill    acetaminophen (TYLENOL) 500 MG tablet Take 500-1,000 mg by mouth 3 times daily as needed (Patient not taking: Reported on 2025)      doxylamine (UNISOM) 25 MG TABS tablet Take 0.5 tablets (12.5 mg) by mouth 3 times daily as needed (nausea or vomiting). 60 tablet 2    Prenatal Vit-Fe Fumarate-FA (TRINATE) TABS Take 1 tablet by mouth daily. 90 tablet 4    pyridOXINE (VITAMIN B6) 25 MG tablet Take 1 tablet (25 mg) by mouth daily as needed (nausea and vomiting). 100 tablet 2    Pyridoxine HCl 10 MG TABS Take 10 mg by mouth every 8 hours as needed (nausea or vomiting). 60 tablet 1    senna-docusate (SENOKOT-S/PERICOLACE) 8.6-50 MG tablet Take 1 tablet by mouth 2 times daily (Patient not taking: Reported on 2025)       No current facility-administered medications for this visit.       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  Substances: none  Tobacco, betel nut: none, none  Alcohol: none  Nonprescribed substances, including marijuana: none    LMP 03/20/2025 (Exact Date)     Labs: reviewed hemoglobin electrophoresis, hemoglobin levels    Ultrasound Findings: See Imaging section for images  Transabdominal images obtained    Adnexal evaluation: grossly normal  Cervix visually closed  + Fetal pole  + Cardiac motion  + Gross movement    Average CRL = 6.16cm = 12w4d    169+FHT and gross movements    BPD = 13w1d  HC = 13w1d  AC = 13w1d  FL = 12w6d  AARON = 13w0d                              Impression: Single living IUP at 13w0d by today's early dating ultrasound.    C/W LMP? No  Redate? Yes  Today's assigned GA: 13w0d  Final KENRICK: 12/11/2025    Genetic Screen Plans: unsure    Aspirin prophylaxis starting at 12-16 wk: Yes  The patient does have a history of:   Any ONE of the following high risk factors:  Pregestational DM1 or DM2  Chronic HTN  Autoimmune dz (Eg SLE, APLS)   H/o Preeclampsia in prior pregnancy  Multifetal gestation  6.   Renal Disease     TWO or more of the following moderate risk factors:  Nulliparity or > 10 years since last birth  Obesity (BMI > 30)  H/o preeclampsia in mother or sister  Age >= 35 years  Experienced anti-black racism or social/structural factors that could impact health  Personal history factors (prior SGA/low birthweight, prior fetal demise)  so WILL start low dose aspirin (81mg) at 12-28 weeks (ideally 12-16 weeks) to prevent preeclampsia.    Special Recommendations for Prenatal Care:    Thalassemia: hemoglobin electrophoresis reveals an elevated fetal hemoglobin, consistent with a beta component. Unknown if there may also be an alpha component. Strong family history of thalassemia including beta naught thalassemia. Recommend patient receive genetic counseling  with her partner. Iron supplementation is unlikely to be effective in this patient. If ferritin is >30, do not supplement iron. May consider B vitamins for cell production. Transfusions for hemoglobins <7.          Diagnoses and all orders for this visit:    Early stage of pregnancy  -     POC US OB; Future    Thalassemia, unspecified type        Follow Up:  No future appointments.    I spent a total of 59 minutes on the day of the visit.   Time spent by me today doing chart review, history and exam, documentation and further activities per the note    Sonia Dick MD

## 2025-06-11 ENCOUNTER — OFFICE VISIT (OUTPATIENT)
Dept: FAMILY MEDICINE | Facility: CLINIC | Age: 30
End: 2025-06-11
Payer: COMMERCIAL

## 2025-06-11 VITALS
RESPIRATION RATE: 20 BRPM | BODY MASS INDEX: 26.45 KG/M2 | TEMPERATURE: 97.5 F | OXYGEN SATURATION: 99 % | DIASTOLIC BLOOD PRESSURE: 75 MMHG | WEIGHT: 131.2 LBS | SYSTOLIC BLOOD PRESSURE: 108 MMHG | HEART RATE: 75 BPM | HEIGHT: 59 IN

## 2025-06-11 DIAGNOSIS — Z11.4 SCREENING FOR HIV (HUMAN IMMUNODEFICIENCY VIRUS): ICD-10-CM

## 2025-06-11 DIAGNOSIS — Z11.59 NEED FOR HEPATITIS C SCREENING TEST: ICD-10-CM

## 2025-06-11 DIAGNOSIS — Z12.4 CERVICAL CANCER SCREENING: ICD-10-CM

## 2025-06-11 DIAGNOSIS — O09.92 SUPERVISION OF HIGH RISK PREGNANCY IN SECOND TRIMESTER: Primary | ICD-10-CM

## 2025-06-11 DIAGNOSIS — O09.90 SUPERVISION OF HIGH RISK PREGNANCY, ANTEPARTUM: ICD-10-CM

## 2025-06-11 LAB
ABO + RH BLD: NORMAL
BLD GP AB SCN SERPL QL: NEGATIVE
ERYTHROCYTE [DISTWIDTH] IN BLOOD BY AUTOMATED COUNT: 18.8 % (ref 10–15)
EST. AVERAGE GLUCOSE BLD GHB EST-MCNC: 100 MG/DL
FERRITIN SERPL-MCNC: 298 NG/ML (ref 6–175)
HBA1C MFR BLD: 5.1 % (ref 0–5.6)
HBV SURFACE AB SERPL IA-ACNC: >1000 M[IU]/ML
HBV SURFACE AB SERPL IA-ACNC: REACTIVE M[IU]/ML
HBV SURFACE AG SERPL QL IA: NONREACTIVE
HCT VFR BLD AUTO: 31.9 % (ref 35–47)
HCV AB SERPL QL IA: NONREACTIVE
HGB BLD-MCNC: 10.4 G/DL (ref 11.7–15.7)
HIV 1+2 AB+HIV1 P24 AG SERPL QL IA: NONREACTIVE
IRON BINDING CAPACITY (ROCHE): 270 UG/DL (ref 240–430)
IRON SATN MFR SERPL: 32 % (ref 15–46)
IRON SERPL-MCNC: 87 UG/DL (ref 37–145)
MCH RBC QN AUTO: 20.1 PG (ref 26.5–33)
MCHC RBC AUTO-ENTMCNC: 32.6 G/DL (ref 31.5–36.5)
MCV RBC AUTO: 62 FL (ref 78–100)
PLATELET # BLD AUTO: 394 10E3/UL (ref 150–450)
RBC # BLD AUTO: 5.17 10E6/UL (ref 3.8–5.2)
RUBV IGG SERPL QL IA: 8.78 INDEX
RUBV IGG SERPL QL IA: POSITIVE
SPECIMEN EXP DATE BLD: NORMAL
SPECIMEN EXP DATE BLD: NORMAL
T PALLIDUM AB SER QL: NONREACTIVE
VZV IGG SER QL IA: 5.83 S/CO
VZV IGG SER QL IA: POSITIVE
WBC # BLD AUTO: 7.9 10E3/UL (ref 4–11)

## 2025-06-11 PROCEDURE — 86706 HEP B SURFACE ANTIBODY: CPT

## 2025-06-11 PROCEDURE — 3078F DIAST BP <80 MM HG: CPT

## 2025-06-11 PROCEDURE — 87591 N.GONORRHOEAE DNA AMP PROB: CPT

## 2025-06-11 PROCEDURE — 86780 TREPONEMA PALLIDUM: CPT

## 2025-06-11 PROCEDURE — 99000 SPECIMEN HANDLING OFFICE-LAB: CPT

## 2025-06-11 PROCEDURE — 3074F SYST BP LT 130 MM HG: CPT

## 2025-06-11 PROCEDURE — 86850 RBC ANTIBODY SCREEN: CPT

## 2025-06-11 PROCEDURE — 86901 BLOOD TYPING SEROLOGIC RH(D): CPT

## 2025-06-11 PROCEDURE — 99207 PR FIRST OB VISIT: CPT | Mod: GC

## 2025-06-11 PROCEDURE — 85027 COMPLETE CBC AUTOMATED: CPT

## 2025-06-11 PROCEDURE — 82728 ASSAY OF FERRITIN: CPT

## 2025-06-11 PROCEDURE — 86803 HEPATITIS C AB TEST: CPT

## 2025-06-11 PROCEDURE — 87086 URINE CULTURE/COLONY COUNT: CPT

## 2025-06-11 PROCEDURE — 83036 HEMOGLOBIN GLYCOSYLATED A1C: CPT

## 2025-06-11 PROCEDURE — 36415 COLL VENOUS BLD VENIPUNCTURE: CPT

## 2025-06-11 PROCEDURE — 83655 ASSAY OF LEAD: CPT | Mod: 90

## 2025-06-11 PROCEDURE — 86787 VARICELLA-ZOSTER ANTIBODY: CPT

## 2025-06-11 PROCEDURE — 86900 BLOOD TYPING SEROLOGIC ABO: CPT

## 2025-06-11 PROCEDURE — 86762 RUBELLA ANTIBODY: CPT

## 2025-06-11 PROCEDURE — 87491 CHLMYD TRACH DNA AMP PROBE: CPT

## 2025-06-11 PROCEDURE — 87389 HIV-1 AG W/HIV-1&-2 AB AG IA: CPT

## 2025-06-11 PROCEDURE — 83550 IRON BINDING TEST: CPT

## 2025-06-11 PROCEDURE — 87340 HEPATITIS B SURFACE AG IA: CPT

## 2025-06-11 PROCEDURE — 83540 ASSAY OF IRON: CPT

## 2025-06-11 RX ORDER — ASPIRIN 81 MG/1
81 TABLET ORAL DAILY
Qty: 30 TABLET | Refills: 2 | Status: SHIPPED | OUTPATIENT
Start: 2025-06-11

## 2025-06-11 NOTE — PROGRESS NOTES
First Obstetric Visit           Assessment and Plan     Ana Olvera is a 29 year old , at 13w6d weeks of pregnancy with KENRICK of Dec 11, 2025 by LMP consistent with 13 week ultrasound.  Patient's history is high risk for the following reasons: Socioeconomic factors and first time pregnancy, family history of beta thalassemia.     # Other concerns (medical problems, high risk surgical/obstetric history, high risk social situation, etc):   - In addition to routine prenatal care, patient will need genetic testing for thalassemias.  - Problem list reviewed and updated.    # General prenatal care management:  - Dating US obtained and dating confirmed? No  - Taking PNV/Folate? Yes, taking appropriately  - Prenatal vitamins ordered.  - New OB labs: CBC, blood type and antibody screen, HIV, VDRL, hep B sAg, rubella, UA/UC, gonorrhea/chlamydia, Hepatitis B immunity, Varicella immunity, and Wet prep done today.  - Will obtain early A1c per clinic protocol.   - Flu shot offered and was Declined.  - Discussed genetic screening. Patient does not want to pursue screening. Discussed risks and benefits of first trimester screen for trisomies, patient declined or was too late in gestational age for this test.   - Discussed screening for thalassemia and/or sickle cell anemia. Patient does not want to pursue Hb electrophoresis.     # Additional Pregnancy Risk Assessment:   - Preeclampsia risk: The patient does have a history of:   Any ONE of the following high risk factors:  Pregestational DM1 or DM2  Chronic HTN  Autoimmune dz (Eg SLE, APLS)   H/o Preeclampsia in prior pregnancy  Multifetal gestation  6.   Renal Disease     TWO or more of the following moderate risk factors:  Nulliparity or > 10 years since last birth  Obesity (BMI > 30)  H/o preeclampsia in mother or sister  Age >= 35 years  Experienced anti-black racism or social/structural factors that could impact health  Personal history factors (prior SGA/low  birthweight, prior fetal demise)  so WILL start low dose aspirin (81mg) at 12-28 weeks (ideally 12-16 weeks) to prevent preeclampsia.    -  birth risk: The patient does not have a history of spontaneous  birth.    # Counseling given:   - Follow up in 4 weeks for return OB visit.  - Recommended weight gain for pregnancy: 15-25 lbs (pregravid BMI 25-29.9)  - Instructed on best evidence for: healthy diet and foods to avoid; exercise and activity during pregnancy; avoiding exposure to toxoplasmosis; safe use of seatbelts during pregnancy; and maintenance of a generally healthy lifestyle  - Patient to see OB educator/ RN today and/or next visit.  - Discussed the harms, benefits, side effects and alternative therapies for current prescribed and OTC medications.     Ana was seen today for prenatal care.    Diagnoses and all orders for this visit:    Supervision of high risk pregnancy in second trimester  -     Ferritin; Future  -     Iron and iron binding capacity; Future  -     ABO/Rh Type-HML; Future  -     Antibody Screen; Future  -     CBC with Plt; Future  -     Culture Urine; Future  -     Hepatitis B Surface Ag; Future  -     HIV Ag/Ab Screen Cascade; Future  -     Lead, Blood; Future  -     Rubella Antibody IgG; Future  -     Syphilis Screen Cascade; Future  -     Chlamydia trachomatis PCR  URINE; Future  -     Neisseria gonorrhoeae PCR  URINE; Future  -     Hepatitis B Surface Ab; Future  -     Jose Luis Zoster Imm Status Ursula; Future  -     Hemoglobin A1c; Future  -     Ferritin  -     Iron and iron binding capacity  -     ABO/Rh Type-HML  -     Antibody Screen  -     CBC with Plt  -     Culture Urine  -     Hepatitis B Surface Ag  -     HIV Ag/Ab Screen Cascade  -     Lead, Blood  -     Rubella Antibody IgG  -     Syphilis Screen Cascade  -     Chlamydia trachomatis PCR  URINE  -     Neisseria gonorrhoeae PCR  URINE  -     Hepatitis B Surface Ab  -     Jose Ulis Zoster Imm Status Ursula  -     Hemoglobin  A1c    Screening for HIV (human immunodeficiency virus)    Need for hepatitis C screening test  -     Hepatitis C Screen Reflex to HCV RNA Quant and Genotype; Future  -     Hepatitis C Screen Reflex to HCV RNA Quant and Genotype    Cervical cancer screening    Supervision of high risk pregnancy, antepartum           There are no Patient Instructions on file for this visit.    Options for treatment and follow-up care were reviewed with the patient and/or guardian. Ana Olvera and/or guardian engaged in the decision making process and verbalized understanding of the options discussed and agreed with the final plan.    Grant Henson DO         HPI       Ana Olvera is a 29 year old woman who presents for an initial prenatal visit at 13w6d weeks of pregnancy with KENRICK of Dec 11, 2025 by LMP of Patient's last menstrual period was 2025 (exact date)..      She has not had bleeding since her LMP.   She has had mild nausea.   Good PO tolerance  This was a planned pregnancy.     ROS:  No - Chest Pain  No - Shortness of Breath  No - Abdominal pain   No - Dysuria   No - Vaginal Discharge    Second/Third Trimester:  No - Contractions  No - Loss of fluid  No - Fetal movement    OTHER CONCERNS: Right side low back pain for the last 3 days radiating to the posterior right leg above the knee            OB HISTORY     OB History    Para Term  AB Living   1 0 0 0 0 0   SAB IAB Ectopic Multiple Live Births   0 0 0 0 0      # Outcome Date GA Lbr Eleazar/2nd Weight Sex Type Anes PTL Lv   1 Current                       MEDICAL/SURGICAL HISTORY     Patient Active Problem List   Diagnosis    Acute appendicitis    Acute appendicitis, unspecified acute appendicitis type    Appendicitis     Past Surgical History:   Procedure Laterality Date    TX LAP,APPENDECTOMY N/A 2020    Procedure: APPENDECTOMY, LAPAROSCOPIC;  Surgeon: Hansel Carmona MD;  Location: [facility] OR;  Service: General     No  "Known Allergies  See nurse history note, reviewed in detail.           MEDICATIONS      Current Outpatient Medications   Medication Sig Dispense Refill    doxylamine (UNISOM) 25 MG TABS tablet Take 0.5 tablets (12.5 mg) by mouth 3 times daily as needed (nausea or vomiting). 60 tablet 2    Prenatal Vit-Fe Fumarate-FA (TRINATE) TABS Take 1 tablet by mouth daily. 90 tablet 4    pyridOXINE (VITAMIN B6) 25 MG tablet Take 1 tablet (25 mg) by mouth daily as needed (nausea and vomiting). 100 tablet 2    Pyridoxine HCl 10 MG TABS Take 10 mg by mouth every 8 hours as needed (nausea or vomiting). 60 tablet 1    acetaminophen (TYLENOL) 500 MG tablet Take 500-1,000 mg by mouth 3 times daily as needed (Patient not taking: Reported on 6/11/2025)      senna-docusate (SENOKOT-S/PERICOLACE) 8.6-50 MG tablet Take 1 tablet by mouth 2 times daily (Patient not taking: Reported on 6/11/2025)               SOCIAL HISTORY   Living situation:Lives in a house with 9 other people with siblings, parents, nieces, and nephews.  Work (including heavy lifting or toxin exposure):Some occasional exposures to toxic chemicals and occasional heavy lifting with work.  Tobacco/nicotine/betel nut: No  Alcohol: No  Other substances (including marijuana):No           Physical Exam      /75 (BP Location: Right arm, Patient Position: Sitting, Cuff Size: Adult Regular)   Pulse 75   Temp 97.5  F (36.4  C) (Oral)   Resp 20   Ht 1.499 m (4' 11\")   Wt 59.5 kg (131 lb 3.2 oz)   LMP 03/20/2025 (Exact Date)   SpO2 99%   BMI 26.50 kg/m    GENERAL: healthy, alert and no distress  NECK: no tenderness, no adenopathy, no asymmetry, no masses, no stiffness; thyroid- normal to palpation  RESP: lungs clear to auscultation - no rales, no rhonchi, no wheezes  CV: regular rates and rhythm, normal S1 S2, no S3 or S4 and no murmur, no click or rub -  ABDOMEN: soft, no tenderness, no  hepatosplenomegaly, no masses, normal bowel sounds  MS: extremities- no gross " deformities noted, no edema  No scars noted.. Fundal height at or below pubic symphysis, FHT not done  UTERUS: Mildly nontender 13 weeks in size.    Past labs reviewed:  BMP and CBC from 4/14/2024 WNL except for Anemia at 10.8  Varicella immune Need titer  Hepatitis B immune Need antibody  Last pap never.  She is due for this today.    =========================================

## 2025-06-11 NOTE — PROGRESS NOTES
Past Medical History     Do you have a history of any of the following medical conditions?    Condition No/Yes/Details   Hypertension No    Heart disease, mitral valve prolapse, rheumatic fever No    Asthma or another chronic lung disease No    An autoimmune disorder No    Kidney disease No    Frequent urinary tract infections No    Epilepsy, seizures, or spells No    Migraine headaches No    Stroke, loss of sensation/function, seizures, or other neuro problem  YES rt leg hip little numbness/tingling   Diabetes No    Thyroid problems or have you taken thyroid medication No    Hepatitis, liver disease, jaundice No    Blood clots, phlebitis, pulmonary embolism or varicose veins No    Excessive bleeding after surgery or dental work No    Do you have more bleeding than other women after a cut or a scratch? 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       No   If yes, then ask next question. If no, skip next question.   Would you rather die than receive a blood transfusion? No    Breast problems No    Have you ever ? No plans to breastfeed   Abnormalities of the uterus No    Abnormal pap smear No    Have you ever been treated for depression? No    Are you having problems with crying spells or loss of self-esteem? No    Have you ever required psychiatric care? No    Have you been physically, sexually, or emotionally hurt by someone? No    Have you been in a major accident or suffered serious trauma? No    Have you ever had any gynecological surgical procedures such as cervical conization, LEEP, laser treatment, cryosurgery of the cervix, or a dilatation and curettage? No    Have you had any other surgical procedures?  YES appendectomy in 2020        Have you ever had any complications from anesthesia? No    Have you ever been hospitalized for a nonsurgical reason? No             Substance use and exposure     Does anyone in your home  smoke? No    Do you use tobacco products or betel nut? No    Prenatal Alcohol Screening:  Before you knew you were pregnant, how much alcohol (beer, wine, liquor) did you drink?    After you found out you were pregnant, how many times did you drink alcohol?    During your pregnancy, how many times did you have 4 or more drinks in a day? No    Do you use any of the following: marijuana, speed, cocaine, heroin, hallucinogens, or other drugs? No               Symptoms since Last Menstrual Period     Do you currently have any of the following symptoms: No/Yes/Details        Abdominal pain No         Blood in the stools or urine No         Chest pain No         Shortness of breath No         coughing or vomiting up blood No         Your heart racing or skipping beats No         Nausea or vomiting  YES once in awhile        Pain on urination No         Vaginal discharge or bleeding No                Genetic Screening          Is the patient 35 years or older? No      Do you have a history of any of the following No/Yes/Details        A metabolic disorder (e.g. Insulin-dependent DM, PKU) No         Recurrent pregnancy loss or still birth No      Do you, the baby's father, or anyone in your families have          Thalassemia AND MCV <80  YES brother and sister have beta thalassemia major        Hemophilia No         Neural tube defect No }        Congenital heart defect No         Sickle cell disease or trait No         Muscular dystrophy No         Cystic fibrosis No         Mental retardation or autism No         Down's syndrome No         Jose Miguel-Sach's disease No         Yue's chorea No         Any other inherited genetic or chromosomal disorder No         A child with birth defects not listed above No                Infection History     Ever treated for tuberculosis or had a positive skin test No    Ever had genital herpes (or has your partner) No    Had a rash or viral illness since LMP No    Ever had a  sexually transmitted infection No    Ever had chicken pox or the vaccine No    Have you had a sexual partner who is HIV positive No    Ever had any other serious infectious disease No                Risk Assessment     Average Risk Category  No significant risk factors: Yes    At Risk Category (up to 3)  Teen pregnancy: No  Poor social situation: No  Domestic abuse: No  Financial difficulties: No  Smoker: No  H/O  deliver: No  H/O drug abuse: No  Non-English speaking: No  Advanced maternal age: No  GDM risks: No  Previous C/S: No  H/O PIH: No  H/O STIs: No  H/O mental health concerns: No  Onset care > 20 weeks: No  Other: pt has abnl hemoglobinopathy and brother and sister have beta thal major  First baby    High Risk Category (4 or more At Risk or)  Diabetes/GDM: No  Multiple gestation: No  Chronic hypertension: No  Significant hx of asthma: No  Fetal demise > 20 weeks: No  Positive tox screen: No  Current mental health treatment: No      Risk: At Risk   Date determined: 25

## 2025-06-12 LAB
BACTERIA UR CULT: NO GROWTH
C TRACH DNA SPEC QL NAA+PROBE: NEGATIVE
LEAD BLDV-MCNC: <2 UG/DL
N GONORRHOEA DNA SPEC QL NAA+PROBE: NEGATIVE
SPECIMEN TYPE: NORMAL
SPECIMEN TYPE: NORMAL

## 2025-06-13 PROBLEM — K35.80 ACUTE APPENDICITIS, UNSPECIFIED ACUTE APPENDICITIS TYPE: Status: RESOLVED | Noted: 2020-07-23 | Resolved: 2025-06-13

## 2025-06-13 PROBLEM — Z34.00 FIRST PREGNANCY: Status: ACTIVE | Noted: 2025-06-11

## 2025-06-13 PROBLEM — D56.9: Status: ACTIVE | Noted: 2025-06-11

## 2025-06-13 PROBLEM — O09.90 SUPERVISION OF HIGH RISK PREGNANCY, ANTEPARTUM: Status: ACTIVE | Noted: 2025-06-11

## 2025-06-13 PROBLEM — Z91.89 AT RISK FOR HYPERTENSION: Status: ACTIVE | Noted: 2025-06-11

## 2025-06-13 PROBLEM — K35.80 ACUTE APPENDICITIS: Status: RESOLVED | Noted: 2020-07-23 | Resolved: 2025-06-13

## 2025-06-13 PROBLEM — O99.019: Status: ACTIVE | Noted: 2025-06-11

## 2025-06-13 PROBLEM — K37 APPENDICITIS: Status: RESOLVED | Noted: 2020-07-24 | Resolved: 2025-06-13

## 2025-06-23 NOTE — PROGRESS NOTES
Preceptor Attestation:    I discussed the patient with the resident and evaluated the patient in person. I have verified the content of the note, which accurately reflects my assessment of the patient and the plan of care.   Supervising Physician:  Sonia Dick MD

## 2025-06-26 ENCOUNTER — RESULTS FOLLOW-UP (OUTPATIENT)
Dept: FAMILY MEDICINE | Facility: CLINIC | Age: 30
End: 2025-06-26

## 2025-07-15 ENCOUNTER — OFFICE VISIT (OUTPATIENT)
Dept: FAMILY MEDICINE | Facility: CLINIC | Age: 30
End: 2025-07-15
Payer: COMMERCIAL

## 2025-07-15 VITALS
BODY MASS INDEX: 26.54 KG/M2 | OXYGEN SATURATION: 100 % | SYSTOLIC BLOOD PRESSURE: 110 MMHG | DIASTOLIC BLOOD PRESSURE: 73 MMHG | TEMPERATURE: 98.7 F | HEART RATE: 84 BPM | RESPIRATION RATE: 20 BRPM | WEIGHT: 131.4 LBS

## 2025-07-15 DIAGNOSIS — Z34.00 PRIMIGRAVIDA, ANTEPARTUM: ICD-10-CM

## 2025-07-15 DIAGNOSIS — Z91.89 AT RISK FOR HYPERTENSION: ICD-10-CM

## 2025-07-15 DIAGNOSIS — Z12.4 CERVICAL CANCER SCREENING: ICD-10-CM

## 2025-07-15 DIAGNOSIS — O09.92 SUPERVISION OF HIGH RISK PREGNANCY IN SECOND TRIMESTER: Primary | ICD-10-CM

## 2025-07-15 NOTE — PROGRESS NOTES
Preceptor Attestation:    I discussed the patient with the resident and evaluated the patient in person. I have verified the content of the note, which accurately reflects my assessment of the patient and the plan of care.   Supervising Physician:  Jayme Dumas MD.

## 2025-07-15 NOTE — PROGRESS NOTES
Assessment & Plan  Ana Olvera is a 29 year old , at 18w5d weeks of pregnancy with KENRICK of Dec 11, 2025 by LMP consistent with 13 week ultrasound.  Patient's history is high risk for the following reasons: Socioeconomic factors and first time pregnancy, family history of beta thalassemia.     # High risk concerns (medical problems, high risk surgical/obstetric history, high risk social situation, etc):   - In addition to routine prenatal care, patient DECLINES genetic testing for thalassemias.  - Problem list reviewed and updated.    Ana was seen today for prenatal care.    Diagnoses and all orders for this visit:    Supervision of high risk pregnancy in second trimester  -     US OB >= 14 Weeks; Future    Primigravida, antepartum    At risk for hypertension    Cervical cancer screening        Weight gain unknown, as weight at first visit is not found: Not found. to date, out of recommended total of 15-25 lbs (pregravid BMI 25-29.9)    Patient Instructions   You will get a call to schedule your Ultrasound, so  for strange numbers.    They may discuss the findings with you at your appointment, but we will also discuss this at your next visit.    Return to clinic in 4 weeks.    Grant Henosn, DO  I precepted today with Jayme Dumas MD.    Subjective  Concerns: None at this time, wants to hear baby heart beat    ROS:  No - Headache  No - Changes in vision  YES - Chest Pain, sounds like heart burn  YES - Shortness of Breath, when walking or using stairs  No - Nausea   No - Vomiting  No - Abdominal pain   No - Contractions  No - Dysuria   No - Vaginal Discharge    No - Vaginal bleeding   No - Loss of Fluid   No - Extremity swelling   Absent - Fetal movement       Patient Active Problem List   Diagnosis    First pregnancy    At risk for hypertension    Thalassemia in mother complicating pregnancy    Supervision of high risk pregnancy, antepartum       Ana Olvera speaks English so an   was not used today.    Guidance:  signs of miscarriage  domestic abuse  smoking intervention  OTC medications  genetic testing  weight gain and exercise  quickening  child birth education  fetal survey ultrasound    Going to [facility]? Yes    Objective  /73 (BP Location: Left arm, Patient Position: Sitting, Cuff Size: Adult Regular)   Pulse 84   Temp 98.7  F (37.1  C) (Oral)   Resp 20   Wt 59.6 kg (131 lb 6.4 oz)   LMP 03/20/2025 (Exact Date)   SpO2 100%   BMI 26.54 kg/m    No distress.  Gravid abdomen.  .  Fundal height 15 cm.  no edema.    Results  Blood type: A POS  No results found for any visits on 07/15/25.

## 2025-07-15 NOTE — PATIENT INSTRUCTIONS
You will get a call to schedule your Ultrasound, so  for strange numbers.    They may discuss the findings with you at your appointment, but we will also discuss this at your next visit.

## 2025-07-28 ENCOUNTER — ANCILLARY PROCEDURE (OUTPATIENT)
Dept: ULTRASOUND IMAGING | Facility: CLINIC | Age: 30
End: 2025-07-28
Attending: FAMILY MEDICINE
Payer: COMMERCIAL

## 2025-07-28 DIAGNOSIS — O09.92 SUPERVISION OF HIGH RISK PREGNANCY IN SECOND TRIMESTER: ICD-10-CM

## 2025-07-28 PROCEDURE — 76805 OB US >/= 14 WKS SNGL FETUS: CPT | Mod: TC | Performed by: RADIOLOGY

## 2025-08-12 ENCOUNTER — OFFICE VISIT (OUTPATIENT)
Dept: FAMILY MEDICINE | Facility: CLINIC | Age: 30
End: 2025-08-12
Payer: COMMERCIAL

## 2025-08-12 VITALS
BODY MASS INDEX: 28.43 KG/M2 | SYSTOLIC BLOOD PRESSURE: 102 MMHG | HEART RATE: 83 BPM | RESPIRATION RATE: 20 BRPM | HEIGHT: 59 IN | TEMPERATURE: 97.8 F | OXYGEN SATURATION: 98 % | WEIGHT: 141 LBS | DIASTOLIC BLOOD PRESSURE: 71 MMHG

## 2025-08-12 DIAGNOSIS — Z91.89 AT RISK FOR HYPERTENSION: ICD-10-CM

## 2025-08-12 DIAGNOSIS — Z34.00 PRIMIGRAVIDA, ANTEPARTUM: ICD-10-CM

## 2025-08-12 DIAGNOSIS — Z12.4 CERVICAL CANCER SCREENING: ICD-10-CM

## 2025-08-12 DIAGNOSIS — O09.92 SUPERVISION OF HIGH RISK PREGNANCY IN SECOND TRIMESTER: Primary | ICD-10-CM
